# Patient Record
(demographics unavailable — no encounter records)

---

## 2024-11-08 NOTE — PHYSICAL EXAM
[Tired appearing] : tired appearing [Soft] : soft [Normal Bowel Sounds] : normal bowel sounds [Tenderness with Palpation] : tenderness with palpation [Enlarged] : enlarged [Anterior Cervical] : anterior cervical [NL] : warm, clear [Toxic] : not toxic [Wheezing] : no wheezing [Rebound tenderness] : no rebound tenderness [de-identified] : tacky mucus membranes [FreeTextEntry8] : < 2 sec cap refill

## 2024-11-08 NOTE — DISCUSSION/SUMMARY
[FreeTextEntry1] :  5 y/o M with acute vomiting and diarrhea due to viral gastroenteritis.  D/w parent and pt indications to be seen, expected course of illness   Supportive care: maintain hydration, can utilize pedialyte/gatorade, avoid fatty and high sugar foods Appropriate anticipatory guidance and education given; seek care if symptoms persist or worsen, inability to tolerate PO, severe abdominal pain, persistent fever

## 2024-11-08 NOTE — HISTORY OF PRESENT ILLNESS
[FreeTextEntry6] : 5 y/o BIB mother for concerns of vomiting and diarrhea. Mother states today had 2 episodes of NBNB emesis, appeared like undigested food. History unclear but also with episodes of diarrhea possibly yesterday. Reports generalized abdominal pain. Decreased PO intake, + UOP this morning. Afebrile. + Congestion and cough which preceded this illness, no respiratory distress. Pt seen at  about 2 weeks ago per parent, COVID, flu, and strep negative. Sibling at home with similar symptoms.

## 2024-11-08 NOTE — REVIEW OF SYSTEMS
[Malaise] : malaise [Nasal Congestion] : nasal congestion [Cough] : cough [Appetite Changes] : appetite changes [Vomiting] : vomiting [Diarrhea] : diarrhea [Negative] : Genitourinary [Ear Pain] : no ear pain [Sore Throat] : no sore throat [Wheezing] : no wheezing